# Patient Record
Sex: MALE | Race: WHITE | Employment: FULL TIME | URBAN - METROPOLITAN AREA
[De-identification: names, ages, dates, MRNs, and addresses within clinical notes are randomized per-mention and may not be internally consistent; named-entity substitution may affect disease eponyms.]

---

## 2019-09-20 NOTE — PROGRESS NOTES
"Subjective     Rodney Green is a 29 year old male who presents to clinic today for the following health issues:    HPI   Rash  Onset: 1 month    Description:   Location: Penis  Character: flakey, painful  Itching (Pruritis): YES    Progression of Symptoms:  intermittent    Therapies Tried and outcome: Patient reports he was treated for Chlamydia although [facility].     Patient Active Problem List   Diagnosis     Other symptoms involving respiratory system and chest     Mild intermittent asthma     CARDIOVASCULAR SCREENING; LDL GOAL LESS THAN 160     No past surgical history on file.    Social History     Tobacco Use     Smoking status: Never Smoker     Smokeless tobacco: Never Used   Substance Use Topics     Alcohol use: Yes     No family history on file.      Current Outpatient Medications   Medication Sig Dispense Refill     miconazole (MICATIN/MICRO GUARD) 2 % external powder Apply topically as needed for itching or other (yeast infection) 90 g 0     Allergies   Allergen Reactions     No Known Drug Allergies      No lab results found.   BP Readings from Last 3 Encounters:   09/23/19 122/74   02/06/14 130/70   01/10/13 118/64    Wt Readings from Last 3 Encounters:   09/23/19 80.2 kg (176 lb 14.4 oz)   02/06/14 78 kg (172 lb)   01/10/13 73.6 kg (162 lb 4.8 oz)                    Reviewed and updated as needed this visit by Provider         Review of Systems   ROS COMP: Constitutional, HEENT, cardiovascular, pulmonary, GI, , musculoskeletal, neuro, skin, endocrine and psych systems are negative, except as otherwise noted.      Objective    /74   Pulse 76   Temp 98.3  F (36.8  C) (Temporal)   Resp 16   Ht 1.752 m (5' 8.98\")   Wt 80.2 kg (176 lb 14.4 oz)   SpO2 99%   BMI 26.14 kg/m    Body mass index is 26.14 kg/m .  Physical Exam   GENERAL: healthy, alert and no distress  RESP: lungs clear to auscultation - no rales, rhonchi or wheezes  CV: " "regular rate and rhythm, normal S1 S2, no S3 or S4, no murmur, click or rub, no peripheral edema and peripheral pulses strong  ABDOMEN: soft, nontender, no hepatosplenomegaly, no masses and bowel sounds normal   (male): testicles normal without atrophy or masses, no hernias and penis normal without urethral discharge  MS: no gross musculoskeletal defects noted, no edema  SKIN: no suspicious lesions or rashes though there is a slight darkening pigmentation to the right lateral urethral opening of the penis.  No erythema is noted in this region no flaking skin is present upon exam today.  Essentially normal to visible exam today.  NEURO: Normal strength and tone, mentation intact and speech normal  PSYCH: mentation appears normal, affect normal/bright    Diagnostic Test Results:  Labs reviewed in Epic  No results found for this or any previous visit (from the past 24 hour(s)).        Assessment & Plan     1. Yeast dermatitis of penis  Will treat based on his overall report by trying to dry the area out with a powder.  If this does not work in the next 7 days then a prescription for Diflucan so it could certainly be done every 3 days for a 12-day period.  - miconazole (MICATIN/MICRO GUARD) 2 % external powder; Apply topically as needed for itching or other (yeast infection)  Dispense: 90 g; Refill: 0     BMI:   Estimated body mass index is 26.14 kg/m  as calculated from the following:    Height as of this encounter: 1.752 m (5' 8.98\").    Weight as of this encounter: 80.2 kg (176 lb 14.4 oz).   Weight management plan: Discussed healthy diet and exercise guidelines    Return in about 4 weeks (around 10/21/2019) for recheck of current condition, Medication Recheck.    Los Noel PA-C  [facility]    "

## 2019-09-23 ENCOUNTER — OFFICE VISIT (OUTPATIENT)
Dept: FAMILY MEDICINE | Facility: OTHER | Age: 29
End: 2019-09-23

## 2019-09-23 VITALS
RESPIRATION RATE: 16 BRPM | SYSTOLIC BLOOD PRESSURE: 122 MMHG | DIASTOLIC BLOOD PRESSURE: 74 MMHG | BODY MASS INDEX: 26.2 KG/M2 | OXYGEN SATURATION: 99 % | HEART RATE: 76 BPM | TEMPERATURE: 98.3 F | HEIGHT: 69 IN | WEIGHT: 176.9 LBS

## 2019-09-23 DIAGNOSIS — B37.49 YEAST DERMATITIS OF PENIS: Primary | ICD-10-CM

## 2019-09-23 PROCEDURE — 99213 OFFICE O/P EST LOW 20 MIN: CPT | Performed by: PHYSICIAN ASSISTANT

## 2019-09-23 ASSESSMENT — MIFFLIN-ST. JEOR: SCORE: 1767.47

## 2019-09-23 ASSESSMENT — PAIN SCALES - GENERAL: PAINLEVEL: MILD PAIN (2)

## 2019-09-23 NOTE — PATIENT INSTRUCTIONS
Consider diflucan prescription if topical powder does not work.  Electronically signed:    Los Noel PA-C

## 2019-09-24 ASSESSMENT — ASTHMA QUESTIONNAIRES: ACT_TOTALSCORE: 25

## 2020-07-01 ENCOUNTER — NURSE TRIAGE (OUTPATIENT)
Dept: NURSING | Facility: CLINIC | Age: 30
End: 2020-07-01

## 2020-07-01 ENCOUNTER — VIRTUAL VISIT (OUTPATIENT)
Dept: FAMILY MEDICINE | Facility: CLINIC | Age: 30
End: 2020-07-01

## 2020-07-01 DIAGNOSIS — M54.9 ACUTE BILATERAL BACK PAIN, UNSPECIFIED BACK LOCATION: Primary | ICD-10-CM

## 2020-07-01 PROCEDURE — 99213 OFFICE O/P EST LOW 20 MIN: CPT | Mod: 95 | Performed by: FAMILY MEDICINE

## 2020-07-01 RX ORDER — CYCLOBENZAPRINE HCL 10 MG
10 TABLET ORAL
Qty: 15 TABLET | Refills: 1 | Status: SHIPPED | OUTPATIENT
Start: 2020-07-01

## 2020-07-01 NOTE — PROGRESS NOTES
"Rodney Green is a 29 year old male who is being evaluated via a billable video visit.      The patient has been notified of following:     \"This video visit will be conducted via a call between you and your physician/provider. We have found that certain health care needs can be provided without the need for an in-person physical exam.  This service lets us provide the care you need with a video conversation.  If a prescription is necessary we can send it directly to your pharmacy.  If lab work is needed we can place an order for that and you can then stop by our lab to have the test done at a later time.    Video visits are billed at different rates depending on your insurance coverage.  Please reach out to your insurance provider with any questions.    If during the course of the call the physician/provider feels a video visit is not appropriate, you will not be charged for this service.\"    Patient has given verbal consent for Video visit? Yes  How would you like to obtain your AVS? Mail a copy  Patient would like the video invitation sent by: Text to cell phone: [phone]  Will anyone else be joining your video visit? No    Subjective     Rodney Green is a 29 year old male who presents today via video visit for the following health issues:    HPI  Back Pain  Duration of complaint: 3 days    Specific cause: None  Description:   Location of pain: low back both, middle of back both and upper back both  Character of pain: dull ache  Pain radiation:none  Intensity: mild  Accompanying Signs & Symptoms:  Fever: no  Numbness or weakness in legs:  no  Dysuria or Hematuria: no  Bowel or bladder incontinence: no  History:   Any injury (lifting, bending, twisting): no  Work Injury: no  History of back problems: no prior back problems  Any previous MRI or X-rays: no  Any history of back surgery: no  Any cancer history: no  Precipitating factors:   Worsened by: Nothing.  Alleviating factors:  Improved by: " n/a  Therapies Tried and outcome: NSAIDS       Video Start Time: 0955 am    Had a phone visit with patient for back pain and fatigue.  He was not recommended to come in the office today due to COVID 19 pandemic restriction.  He was offered to come in today if he feels the need to be seen, he declined and felt comfortable with the phone visit.      Rodney has been having 3 days.  Constant, dull and achy pain that started in the lower back that radiates up his neck.  Pain is worse with movement or twisting his back from side to side. No trauma or unusual activities.  Never had it before.  Not feeling much of the muscle spasm but his back feels stiff. Been feeling fatigue - been sleeping a lot in the last couple days.  Feeling better today. No fever or chill.  No URI symptoms.  No CP/SOB.  No problem with urination.  No exposure to acute illnesses.  Took a dose of aspirin and it did not help.  Eating and drinking ok.  No weakness. Otherwise healthy.  No other concern today.No    No current outpatient medications on file.     Allergies   Allergen Reactions     No Known Drug Allergies      Seasonal Allergies        Reviewed and updated as needed this visit by Provider         Review of Systems   Constitutional, HEENT, cardiovascular, pulmonary, gi and gu systems are negative, except as otherwise noted.      Objective             Physical Exam     GENERAL: Healthy, alert and no distress  RESP: No audible wheeze, cough, or visible cyanosis.  No visible retractions or increased work of breathing.    MS: No gross musculoskeletal defects noted.  Normal range of motion.    NEURO: Cranial nerves grossly intact.  Mentation and speech appropriate for age.  PSYCH: Mentation appears normal, affect normal/bright, judgement and insight intact, normal speech and appearance well-groomed.      Diagnostic Test Results:  Labs reviewed in Epic  No results found for any visits on 07/01/20.        Assessment & Plan       ICD-10-CM    1.  "Acute bilateral back pain, unspecified back location  M54.9 cyclobenzaprine (FLEXERIL) 10 MG tablet     Rodney has 3 day history of back pain with fatigue.  Been sleeping a lot and his fatigue is getting better.  No URI symptoms or fever.  Clinical presentation suggested of muscle in nature for his pain.  Discussed with him about the nature of the condition.  Encouraged to continue with his normal activities as tolerated and may try OTC Ibuprofen or Tylenol as needed for pain. Stretching exercise also recommended.  Start Flexeril at bedtime and side effect discussed.  Call in if the symptoms persist or worse.  All of his questions were answered.     BMI:   Estimated body mass index is 26.14 kg/m  as calculated from the following:    Height as of 9/23/19: 1.752 m (5' 8.98\").    Weight as of 9/23/19: 80.2 kg (176 lb 14.4 oz).   Weight management plan: Patient was referred to their PCP to discuss a diet and exercise plan.      Return in about 3 months (around 10/1/2020) for Physical Exam.    Cody Ricardo Mai, MD  [facility]      Video-Visit Details    Type of service:  Video Visit    Video End Time:1004 am    Originating Location (pt. Location): Home    Distant Location (provider location):  [facility]     Platform used for Video Visit: Doximity    Return in about 3 months (around 10/1/2020) for Physical Exam.       Cody Ricardo Mai, MD        "